# Patient Record
(demographics unavailable — no encounter records)

---

## 2024-11-19 NOTE — DISCUSSION/SUMMARY
[de-identified] : This patient presents today for evaluation regarding complaints of bilateral shoulder pain.  Physical exam and x-rays reveals evidence of severe osteoarthritis about the shoulders.  Discussed the diagnosis and treatment recommendations.  She cannot take anti-inflammatories due to a hypertension.  I recommended Tylenol and reduced activities.  If symptoms should worsen we consider a subacromial injection on follow-up.  She should also reduce her activities and avoid any strenuous pulling pushing and lifting.  At least 30 minutes was spent performing the evaluation and management on today's office visit.  This includes but is not limited to preparing to see patient including review of any test results or outside medical records, obtaining and/or reviewing separately obtained history, performing examination and evaluation, counseling and educating the patient on their diagnosis and treatment recommendations, ordering medications, tests, or procedures, documenting clinical information in the electronic health record, independently interpreting results (not separately reported) and communicating results to the patient, and coordination of care.

## 2024-11-19 NOTE — HISTORY OF PRESENT ILLNESS
[de-identified] : This patient presents today complaining of bilateral shoulder pain.  Pain is worse with activity such as lifting pulling and pushing.  Pain is improved with rest.  Minimal pain noted at rest.  Patient denies any injury.  Takes Tylenol for the pain.  Does note stiffness about both shoulders.  Pain localized to the anterior aspect of the shoulders.

## 2024-11-19 NOTE — PHYSICAL EXAM
[de-identified] : The patient appears well nourished  and in no apparent distress.  The patient is alert and oriented to person, place, and time.   Affect and mood appear normal.    The head is normocephalic and atraumatic.  The eyes reveal normal sclera and extra ocular muscles are intact.   The neck appears normal with no jugular venous distention or masses noted.   Skin shows normal turgor with no evidence of eczema or psoriasis.  No respiratory distress noted.  The patient ambulates with a normal gait.  The right and left shoulders have moderate loss of range of motion.  Positive impingement sign and positive Villeda sign.  Pain noted with terminal motion.  Weakness of the rotator cuff bilaterally.    There is no soft tissue swelling.  There is no eccyhmosis.  There is no warmth or erythema.    There is no instabililty on exam.  No lymphadenopathy or edema is noted.  Pulses and capillary refill are normal.  There is no muscular atrophy.  Strength and sensation are intact distally.   [de-identified] : AP, outlet,  and glenoid and axillary views of the right and left shoulders were obtained.  There is significant osteoarthritis with joint space narrowing and osteophyte formation.  There is no fracture, dislocation, or subluxation.